# Patient Record
Sex: MALE | Race: WHITE | NOT HISPANIC OR LATINO | Employment: FULL TIME | URBAN - NONMETROPOLITAN AREA
[De-identification: names, ages, dates, MRNs, and addresses within clinical notes are randomized per-mention and may not be internally consistent; named-entity substitution may affect disease eponyms.]

---

## 2020-12-22 ENCOUNTER — TELEMEDICINE (OUTPATIENT)
Dept: FAMILY MEDICINE CLINIC | Facility: CLINIC | Age: 30
End: 2020-12-22
Payer: COMMERCIAL

## 2020-12-22 ENCOUNTER — NURSE TRIAGE (OUTPATIENT)
Dept: OTHER | Facility: OTHER | Age: 30
End: 2020-12-22

## 2020-12-22 DIAGNOSIS — Z20.822 CLOSE EXPOSURE TO COVID-19 VIRUS: Primary | ICD-10-CM

## 2020-12-22 DIAGNOSIS — Z20.828 SARS-ASSOCIATED CORONAVIRUS EXPOSURE: Primary | ICD-10-CM

## 2020-12-22 DIAGNOSIS — Z20.828 SARS-ASSOCIATED CORONAVIRUS EXPOSURE: ICD-10-CM

## 2020-12-22 PROCEDURE — U0003 INFECTIOUS AGENT DETECTION BY NUCLEIC ACID (DNA OR RNA); SEVERE ACUTE RESPIRATORY SYNDROME CORONAVIRUS 2 (SARS-COV-2) (CORONAVIRUS DISEASE [COVID-19]), AMPLIFIED PROBE TECHNIQUE, MAKING USE OF HIGH THROUGHPUT TECHNOLOGIES AS DESCRIBED BY CMS-2020-01-R: HCPCS | Performed by: FAMILY MEDICINE

## 2020-12-22 PROCEDURE — 99213 OFFICE O/P EST LOW 20 MIN: CPT | Performed by: FAMILY MEDICINE

## 2020-12-23 LAB — SARS-COV-2 RNA SPEC QL NAA+PROBE: NOT DETECTED

## 2021-09-08 ENCOUNTER — RX ONLY (RX ONLY)
Age: 31
End: 2021-09-08

## 2021-09-08 ENCOUNTER — DOCTOR'S OFFICE (OUTPATIENT)
Dept: URBAN - NONMETROPOLITAN AREA CLINIC 1 | Facility: CLINIC | Age: 31
Setting detail: OPHTHALMOLOGY
End: 2021-09-08
Payer: COMMERCIAL

## 2021-09-08 DIAGNOSIS — D23.111: ICD-10-CM

## 2021-09-08 DIAGNOSIS — D23.112: ICD-10-CM

## 2021-09-08 PROCEDURE — 92285 EXTERNAL OCULAR PHOTOGRAPHY: CPT | Performed by: OPHTHALMOLOGY

## 2021-09-08 PROCEDURE — 92004 COMPRE OPH EXAM NEW PT 1/>: CPT | Performed by: OPHTHALMOLOGY

## 2021-09-08 ASSESSMENT — REFRACTION_AUTOREFRACTION
OS_SPHERE: +0.50
OD_CYLINDER: 0.00
OD_SPHERE: +0.75
OS_CYLINDER: 0.00

## 2021-09-08 ASSESSMENT — KERATOMETRY
OS_K1POWER_DIOPTERS: 43.75
OS_K2POWER_DIOPTERS: 44.25
OD_AXISANGLE_DEGREES: 116
OD_K2POWER_DIOPTERS: 45.00
OD_K1POWER_DIOPTERS: 44.00
OS_AXISANGLE_DEGREES: 80

## 2021-09-08 ASSESSMENT — CONFRONTATIONAL VISUAL FIELD TEST (CVF)
OD_FINDINGS: FULL
OS_FINDINGS: FULL

## 2021-09-08 ASSESSMENT — SPHEQUIV_DERIVED
OS_SPHEQUIV: 0.5
OD_SPHEQUIV: 0.75

## 2021-09-08 ASSESSMENT — AXIALLENGTH_DERIVED
OD_AL: 22.9503
OS_AL: 23.2194

## 2021-09-08 ASSESSMENT — VISUAL ACUITY
OS_BCVA: 20/20
OD_BCVA: 20/20

## 2021-09-29 ENCOUNTER — DOCTOR'S OFFICE (OUTPATIENT)
Dept: URBAN - NONMETROPOLITAN AREA CLINIC 1 | Facility: CLINIC | Age: 31
Setting detail: OPHTHALMOLOGY
End: 2021-09-29
Payer: COMMERCIAL

## 2021-09-29 DIAGNOSIS — D23.111: ICD-10-CM

## 2021-09-29 DIAGNOSIS — D23.112: ICD-10-CM

## 2021-09-29 PROCEDURE — 11440 EXC FACE-MM B9+MARG 0.5 CM/<: CPT | Performed by: OPHTHALMOLOGY

## 2021-09-29 ASSESSMENT — REFRACTION_AUTOREFRACTION
OD_CYLINDER: 0.00
OS_CYLINDER: 0.00
OS_SPHERE: +0.50
OD_SPHERE: +0.75

## 2021-09-29 ASSESSMENT — AXIALLENGTH_DERIVED
OS_AL: 23.2194
OD_AL: 22.9503

## 2021-09-29 ASSESSMENT — SPHEQUIV_DERIVED
OD_SPHEQUIV: 0.75
OS_SPHEQUIV: 0.5

## 2021-09-29 ASSESSMENT — KERATOMETRY
OD_K1POWER_DIOPTERS: 44.00
OD_K2POWER_DIOPTERS: 45.00
OS_K1POWER_DIOPTERS: 43.75
OS_K2POWER_DIOPTERS: 44.25
OS_AXISANGLE_DEGREES: 80
OD_AXISANGLE_DEGREES: 116

## 2021-09-29 ASSESSMENT — VISUAL ACUITY
OD_BCVA: 20/20
OS_BCVA: 20/20

## 2021-10-11 ENCOUNTER — DOCTOR'S OFFICE (OUTPATIENT)
Dept: URBAN - NONMETROPOLITAN AREA CLINIC 1 | Facility: CLINIC | Age: 31
Setting detail: OPHTHALMOLOGY
End: 2021-10-11
Payer: COMMERCIAL

## 2021-10-11 DIAGNOSIS — D23.112: ICD-10-CM

## 2021-10-11 DIAGNOSIS — D23.111: ICD-10-CM

## 2021-10-11 PROCEDURE — 92012 INTRM OPH EXAM EST PATIENT: CPT | Performed by: OPHTHALMOLOGY

## 2021-10-11 ASSESSMENT — KERATOMETRY
OD_K2POWER_DIOPTERS: 44.75
OS_K1POWER_DIOPTERS: 44.25
OD_AXISANGLE_DEGREES: 113
OD_K1POWER_DIOPTERS: 44.00
OS_AXISANGLE_DEGREES: 075
OS_K2POWER_DIOPTERS: 44.50

## 2021-10-11 ASSESSMENT — AXIALLENGTH_DERIVED
OS_AL: 23.1335
OD_AL: 23.0868

## 2021-10-11 ASSESSMENT — REFRACTION_AUTOREFRACTION
OS_AXIS: 076
OS_CYLINDER: -0.25
OD_SPHERE: +0.50
OD_CYLINDER: 0.00
OS_SPHERE: +0.50

## 2021-10-11 ASSESSMENT — VISUAL ACUITY
OD_BCVA: 20/20
OS_BCVA: 20/20

## 2021-10-11 ASSESSMENT — SPHEQUIV_DERIVED
OS_SPHEQUIV: 0.375
OD_SPHEQUIV: 0.5

## 2021-10-11 ASSESSMENT — CONFRONTATIONAL VISUAL FIELD TEST (CVF)
OD_FINDINGS: FULL
OS_FINDINGS: FULL

## 2022-10-31 ENCOUNTER — HOSPITAL ENCOUNTER (EMERGENCY)
Facility: HOSPITAL | Age: 32
Discharge: HOME | End: 2022-10-31
Attending: EMERGENCY MEDICINE

## 2022-10-31 VITALS
RESPIRATION RATE: 20 BRPM | HEART RATE: 74 BPM | OXYGEN SATURATION: 100 % | TEMPERATURE: 98.2 F | DIASTOLIC BLOOD PRESSURE: 94 MMHG | SYSTOLIC BLOOD PRESSURE: 125 MMHG

## 2022-10-31 DIAGNOSIS — R68.84 JAW PAIN: Primary | ICD-10-CM

## 2022-10-31 RX ORDER — METHOCARBAMOL 500 MG/1
500 TABLET, FILM COATED ORAL 4 TIMES DAILY
Qty: 20 TABLET | Refills: 0 | Status: SHIPPED | OUTPATIENT
Start: 2022-10-31 | End: 2022-11-05

## 2022-10-31 NOTE — ED PROVIDER NOTES
History  Chief Complaint   Patient presents with   • Jaw Pain     Patient presents to the ED for jaw pain, both sides of face  Patient states ongoing pain unresolved with motrin  No injury  Pain x 1 week  71-year-old male presents with jaw pain initially started on the right side and now is bilateral over the last 3 days  Patient points just above the angle of the mandible as to the area of discomfort he has noted no facial swelling or redness is teeth are in good repair he has had no fever or chills denies sore throat voice change difficulty swallowing there has been no swollen glands no chest pain shortness of breath or pleuritic pain he does not feel as if he has had a sinus infection no earache no clicking when he yawns 1st thing in the morning it is rather symptomatic he has been taking ibuprofen but it has not helped much  Patient denies prior history of this type of discomfort chewing can make the pain worse  He denies headache  He has had no abdominal pain nausea vomiting diarrhea or testicular pain  Denies any facial trauma he does have slight malocclusion no pain with brushing his teeth he is not sensitive to hot or cold  Childhood immunizations are up-to-date          None       History reviewed  No pertinent past medical history  Past Surgical History:   Procedure Laterality Date   • ADENOIDECTOMY     • EYE SURGERY     • LITHOTRIPSY         Family History   Problem Relation Age of Onset   • No Known Problems Mother    • No Known Problems Father      I have reviewed and agree with the history as documented  E-Cigarette/Vaping   • E-Cigarette Use Never User      E-Cigarette/Vaping Substances   • Nicotine No    • THC No    • CBD No    • Flavoring No    • Other No    • Unknown No      Social History     Tobacco Use   • Smoking status: Never Smoker   • Smokeless tobacco: Current User     Types: Chew   Vaping Use   • Vaping Use: Never used   Substance Use Topics   • Alcohol use:  Yes Comment: occasionally   • Drug use: Never       Review of Systems   Constitutional: Negative for activity change, chills and fever  HENT: Negative for congestion, drooling, ear discharge, ear pain, facial swelling, mouth sores, rhinorrhea, sinus pressure, sneezing, sore throat, trouble swallowing and voice change  Eyes: Negative for discharge  Respiratory: Negative for cough and shortness of breath  Cardiovascular: Negative for chest pain and leg swelling  Gastrointestinal: Negative for abdominal pain, blood in stool, diarrhea, nausea and vomiting  Endocrine: Negative for polyuria  Genitourinary: Negative for difficulty urinating, scrotal swelling, testicular pain and urgency  Musculoskeletal: Negative for back pain and myalgias (yesterday transient)  Skin: Negative for rash  Neurological: Negative for dizziness, weakness, light-headedness, numbness and headaches  Hematological: Negative for adenopathy  Psychiatric/Behavioral: Negative for confusion  All other systems reviewed and are negative  Physical Exam  Physical Exam  Vitals and nursing note reviewed  Constitutional:       General: He is not in acute distress  Appearance: He is well-developed  He is not diaphoretic  HENT:      Head: Normocephalic and atraumatic  Comments: No tenderness overlying the actual TMJ joint he has some mild discomfort just inferior to that area there is no facial edema or erythema no preauricular lymph nodes posterior pharynx is normal uvula is midline tonsils are 1+ without exudates teeth are in good repair there is no elevation of the tongue there is no inflammation of Stensen's duct bilaterally patient has no reproducible tenderness with palpation of the parotid glands from the intraoral side bilaterally he has no tenderness with palpation of the frontal or maxillary sinuses  No tenderness overlying the mastoid region    He has no tenderness with percussion of the teeth     Right Ear: Tympanic membrane, ear canal and external ear normal       Left Ear: Tympanic membrane, ear canal and external ear normal       Nose: Nose normal  No congestion or rhinorrhea  Mouth/Throat:      Mouth: Mucous membranes are moist       Pharynx: No oropharyngeal exudate or posterior oropharyngeal erythema  Eyes:      General: No scleral icterus  Right eye: No discharge  Left eye: No discharge  Extraocular Movements: Extraocular movements intact  Conjunctiva/sclera: Conjunctivae normal       Pupils: Pupils are equal, round, and reactive to light  Cardiovascular:      Rate and Rhythm: Normal rate and regular rhythm  Heart sounds: Normal heart sounds  Pulmonary:      Effort: Pulmonary effort is normal  No respiratory distress  Breath sounds: Normal breath sounds  Abdominal:      General: Bowel sounds are normal  There is no distension  Palpations: Abdomen is soft  There is no mass  Tenderness: There is no abdominal tenderness  There is no right CVA tenderness, left CVA tenderness, guarding or rebound  Comments: Back: no mildline or CVA tenderness   Musculoskeletal:         General: Normal range of motion  Cervical back: Normal range of motion and neck supple  No rigidity or tenderness  Right lower leg: No edema  Left lower leg: No edema  Lymphadenopathy:      Cervical: No cervical adenopathy  Skin:     General: Skin is warm and dry  Capillary Refill: Capillary refill takes less than 2 seconds  Neurological:      Mental Status: He is alert and oriented to person, place, and time  Cranial Nerves: No cranial nerve deficit  Sensory: No sensory deficit  Motor: No weakness or abnormal muscle tone        Coordination: Coordination normal       Gait: Gait normal       Comments: Gait steady   Psychiatric:         Mood and Affect: Mood normal          Vital Signs  ED Triage Vitals [10/31/22 1735]   Temperature Pulse Respirations Blood Pressure SpO2   98 2 °F (36 8 °C) 68 20 125/94 100 %      Temp Source Heart Rate Source Patient Position - Orthostatic VS BP Location FiO2 (%)   Temporal -- -- -- --      Pain Score       10 - Worst Possible Pain           Vitals:    10/31/22 1735 10/31/22 1745   BP: 125/94 125/94   Pulse: 68 74         Visual Acuity      ED Medications  Medications - No data to display    Diagnostic Studies  Results Reviewed     None                 No orders to display              Procedures  Procedures         ED Course                                             MDM  Number of Diagnoses or Management Options  Jaw pain  Diagnosis management comments: Mdm:  76-year-old male presents with bilateral preauricular pain  There is no facial swelling erythema he is not toxic-appearing not consistent with a bacterial prostatitis we discussed the possibility of a viral proctitis (months) but he is not demonstrating evidence of a viral syndrome this does not appear to be referred from other areas there is no chest pain is not consistent with a dental problem or abscess  There is no history of any trauma we discussed a stepwise approach  Does have some mild malocclusion  Will prescribe a muscle relaxant recommend he get an over-the-counter marked guard to wear at night in case there is any teeth grinding  To rest his TMJ joint  No chew diet was reviewed he is to continue with NSAIDs  We did discuss that he is not to drink drive or operate heavy machinery with the muscle relaxants (methocarbamol) recommend dental follow-up  She experience any chest pain shortness of breath facial swelling fever chills viral syndrome he is to return for re-evaluation  He has persistent malocclusion we can pursue a CT of the facial bones to further evaluate the jaw but we did review this is a lot of radiation and recommended deferring until he try symptomatic management    At this may be a self-limiting process patient was in agreement      Disposition  Final diagnoses:   Jaw pain - TMJ vs other     Time reflects when diagnosis was documented in both MDM as applicable and the Disposition within this note     Time User Action Codes Description Comment    10/31/2022  5:58 PM Bettie Mabry Dennis [U90 [id number]] Jaw pain     10/31/2022  5:59 PM Kitty Cota [[id number]] Jaw pain TMJ vs other      ED Disposition     ED Disposition   Discharge    Condition   Stable    Date/Time   Mon Oct 31, 2022  6:02 PM    Comment   Kedar Calderon discharge to home/self care  Follow-up Information     Follow up With Specialties Details Why [address] [facility]  Call  or follow up with your local dentist [address] [place] [address]  [phone]          There are no discharge medications for this patient  No discharge procedures on file      PDMP Review     None          ED Provider  Electronically Signed by           Luis Roberts MD  10/31/22 9923

## 2022-10-31 NOTE — DISCHARGE INSTRUCTIONS
Tylenol 650mg every 6 hours as needed for pain, fever (max 3000mg in 24 hours)   Aleve 2 tabs twice daily with food OR ibuprofen 200-800mg every 8 hours with food as needed for pain   Get over the counter mouth guard and wear at night  Soft no chew diet - mush food that require no tearing or significant chewing eg   Noodles milkshakes, mash potatoes tender cooked meats (small bites)  Return with fever facial swelling facial redness tooth pain voice change trouble swallowing